# Patient Record
Sex: FEMALE | Race: WHITE | HISPANIC OR LATINO
[De-identification: names, ages, dates, MRNs, and addresses within clinical notes are randomized per-mention and may not be internally consistent; named-entity substitution may affect disease eponyms.]

---

## 2021-06-29 PROBLEM — Z00.00 ENCOUNTER FOR PREVENTIVE HEALTH EXAMINATION: Status: ACTIVE | Noted: 2021-06-29

## 2021-07-09 ENCOUNTER — APPOINTMENT (OUTPATIENT)
Dept: MATERNAL FETAL MEDICINE | Facility: CLINIC | Age: 31
End: 2021-07-09
Payer: MEDICAID

## 2021-07-09 ENCOUNTER — APPOINTMENT (OUTPATIENT)
Dept: ANTEPARTUM | Facility: CLINIC | Age: 31
End: 2021-07-09

## 2021-07-09 VITALS
RESPIRATION RATE: 16 BRPM | BODY MASS INDEX: 29.45 KG/M2 | DIASTOLIC BLOOD PRESSURE: 62 MMHG | WEIGHT: 156 LBS | HEIGHT: 61 IN | HEART RATE: 60 BPM | OXYGEN SATURATION: 98 % | SYSTOLIC BLOOD PRESSURE: 110 MMHG

## 2021-07-09 DIAGNOSIS — R76.0 RAISED ANTIBODY TITER: ICD-10-CM

## 2021-07-09 DIAGNOSIS — Z31.69 ENCOUNTER FOR OTHER GENERAL COUNSELING AND ADVICE ON PROCREATION: ICD-10-CM

## 2021-07-09 DIAGNOSIS — R76.8 OTHER SPECIFIED ABNORMAL IMMUNOLOGICAL FINDINGS IN SERUM: ICD-10-CM

## 2021-07-09 DIAGNOSIS — N96 RECURRENT PREGNANCY LOSS: ICD-10-CM

## 2021-07-09 DIAGNOSIS — Z87.59 PERSONAL HISTORY OF OTHER COMPLICATIONS OF PREGNANCY, CHILDBIRTH AND THE PUERPERIUM: ICD-10-CM

## 2021-07-09 PROCEDURE — 99203 OFFICE O/P NEW LOW 30 MIN: CPT

## 2021-07-09 NOTE — DISCUSSION/SUMMARY
[FreeTextEntry1] : The patient is a 31-year-old -0-2-1 being seen today for preconceptual counseling secondary to recurrent pregnancy loss and positive toxoplasmosis and CMV IgG antibody titer.\par \par Her obstetrical history is significant for delivery in  of a liveborn female  weighing 7 pounds 0 ounces delivered at term via  section for failure to descend.  No problems or complications were noted with that pregnancy.  She has had 2 early first trimester complete miscarriages in the past year with a new father.\par \par Recurrent pregnancy loss;\par \par The patient understands that approximately 20% of recognized pregnancies will result in miscarriage and that as high as 40% of women who become pregnant will miscarry.  She has had 1 successful pregnancy with her previous partner.\par \par Her clinical presentation is consistent with chromosomal abnormality as the etiology for her 2 early pregnancy losses.  Up to 60% of miscarriages occur secondary to chromosomal aneuploidy with most common chromosomal abnormality being 45 XO.  In addition there is the potential that there is a paternal chromosomal abnormality.  Blood work was ordered for both the patient and her partner for chromosomal analysis.\par \par Minor abnormalities that may cause problems including uterine septum and or fibroids as well as antiphospholipid antibody syndrome or a positive thrombophilia panel were discussed.  In addition a luteal phase defect with low progesterone levels may be a cause of recurrent pregnancy loss.  A gynecological ultrasound was performed in  and was normal.  Hysterosalpingogram can be performed to evaluate the uterine cavity for adhesions and or scar tissue related to her previous  section.  Antiphospholipid antibody syndrome as well as a positive thrombophilia panel were discussed.  The patient understands that there are certain antibodies that can be produced that can cause an inflammatory process at the level of the capillary formation in the placenta.  This may cause a critical number of blood vessels to become calcified which may cause recurrent miscarriage.  Blood work was ordered and we will discuss the results of all the above blood work once available to us.  The patient was reassured that she should be able to get pregnant with Elder and have a normal pregnancy.\par \par The patient has a positive CMV and toxoplasmosis IgG antibody.  Prescription was placed for CMV and toxoplasmosis IgM antibodies.  Again management will be discussed based on those results.\par \par COVID-19 vaccination;\par \par COVID-19 vaccination pregnancy was discussed.  In addition vaccination while not pregnant was also discussed.  The patient has declined vaccination.\par \par I spent a total of 37 minutes reviewing the patient's outside medical records and medical history counseling and coordinating care.\par \par Recommendations;\par \par 1.  Paternal peripheral blood chromosome analysis was ordered.\par 2.  Antiphospholipid antibody syndrome blood work ordered.\par 3.  Thrombophilia panel blood work was ordered.\par 4.  CMV and toxoplasmosis IgM antibodies ordered.\par 5.  Patient advised to begin low-dose aspirin with a positive pregnancy test.\par 6.  Follow-up discussion once all of the above are available.\par 7.  Serum progesterone level once pregnant with first trimester progesterone support was discussed.

## 2021-09-24 ENCOUNTER — LABORATORY RESULT (OUTPATIENT)
Age: 31
End: 2021-09-24

## 2021-09-27 LAB
APTT BLD: 27.3 SEC
CMV IGM SERPL QL: <8 AU/ML
CMV IGM SERPL QL: NEGATIVE
HCYS SERPL-MCNC: 10.1 UMOL/L
SILICA CLOTTING TIME INTERPRETATION: NORMAL
SILICA CLOTTING TIME S/C: 0.9 RATIO
T GONDII AB SER-IMP: NEGATIVE
T GONDII IGM SER QL: <3 AU/ML

## 2021-09-28 ENCOUNTER — NON-APPOINTMENT (OUTPATIENT)
Age: 31
End: 2021-09-28

## 2021-09-28 LAB
AT III PPP CHRO-ACNC: 83 %
B2 GLYCOPROT1 AB SER QL: NEGATIVE
CARDIOLIPIN AB SER IA-ACNC: NEGATIVE
CONFIRM: 29.8 SEC
DRVVT IMM 1:2 NP PPP: NORMAL
DRVVT SCREEN TO CONFIRM RATIO: 0.9 RATIO
PROT S AG ACT/NOR PPP IA: 90 %
SCREEN DRVVT: 36.1 SEC

## 2021-10-04 LAB
DNA PLOIDY SPEC FC-IMP: NORMAL
PTR INTERP: NORMAL

## 2022-05-17 ENCOUNTER — ASOB RESULT (OUTPATIENT)
Age: 32
End: 2022-05-17

## 2022-05-17 ENCOUNTER — APPOINTMENT (OUTPATIENT)
Dept: MATERNAL FETAL MEDICINE | Facility: CLINIC | Age: 32
End: 2022-05-17
Payer: MEDICAID

## 2022-05-17 PROCEDURE — 99202 OFFICE O/P NEW SF 15 MIN: CPT | Mod: 95

## 2022-05-17 PROCEDURE — 99212 OFFICE O/P EST SF 10 MIN: CPT | Mod: 95

## 2022-06-22 ENCOUNTER — NON-APPOINTMENT (OUTPATIENT)
Age: 32
End: 2022-06-22